# Patient Record
Sex: MALE | Race: WHITE | NOT HISPANIC OR LATINO | Employment: FULL TIME | URBAN - METROPOLITAN AREA
[De-identification: names, ages, dates, MRNs, and addresses within clinical notes are randomized per-mention and may not be internally consistent; named-entity substitution may affect disease eponyms.]

---

## 2017-01-31 ENCOUNTER — PATIENT MESSAGE (OUTPATIENT)
Dept: MEDICAL GROUP | Facility: PHYSICIAN GROUP | Age: 27
End: 2017-01-31

## 2017-01-31 RX ORDER — RIVAROXABAN 20 MG/1
TABLET, FILM COATED ORAL
Qty: 90 TAB | Refills: 0 | Status: SHIPPED | OUTPATIENT
Start: 2017-01-31 | End: 2019-03-21

## 2017-02-02 ENCOUNTER — TELEPHONE (OUTPATIENT)
Dept: MEDICAL GROUP | Facility: PHYSICIAN GROUP | Age: 27
End: 2017-02-02

## 2017-02-02 NOTE — TELEPHONE ENCOUNTER
MEDICATION PRIOR AUTHORIZATION NEEDED:    1. Name of Medication: Xarelto 20 mg    2. Requested By (Name of Pharmacy): [facility]     3. Is insurance on file current? Yes    4. What is the name & phone number of the 3rd party payor? [facility] [phone]

## 2019-03-21 ENCOUNTER — OFFICE VISIT (OUTPATIENT)
Dept: URGENT CARE | Facility: CLINIC | Age: 29
End: 2019-03-21
Payer: COMMERCIAL

## 2019-03-21 VITALS
HEIGHT: 73 IN | HEART RATE: 101 BPM | BODY MASS INDEX: 38.8 KG/M2 | WEIGHT: 292.8 LBS | SYSTOLIC BLOOD PRESSURE: 118 MMHG | OXYGEN SATURATION: 95 % | DIASTOLIC BLOOD PRESSURE: 82 MMHG | TEMPERATURE: 98.2 F | RESPIRATION RATE: 18 BRPM

## 2019-03-21 DIAGNOSIS — J06.9 VIRAL URI WITH COUGH: ICD-10-CM

## 2019-03-21 DIAGNOSIS — J04.0 VIRAL LARYNGITIS: ICD-10-CM

## 2019-03-21 DIAGNOSIS — B97.89 VIRAL LARYNGITIS: ICD-10-CM

## 2019-03-21 PROCEDURE — 99214 OFFICE O/P EST MOD 30 MIN: CPT | Performed by: PHYSICIAN ASSISTANT

## 2019-03-21 RX ORDER — CODEINE PHOSPHATE/GUAIFENESIN 10-100MG/5
5 LIQUID (ML) ORAL 3 TIMES DAILY PRN
Qty: 120 ML | Refills: 0 | Status: SHIPPED | OUTPATIENT
Start: 2019-03-21 | End: 2019-03-28

## 2019-03-21 RX ORDER — METOPROLOL SUCCINATE 25 MG/1
25 TABLET, EXTENDED RELEASE ORAL DAILY
COMMUNITY
End: 2019-04-26 | Stop reason: SDUPTHER

## 2019-03-21 RX ORDER — AMLODIPINE BESYLATE 10 MG/1
10 TABLET ORAL DAILY
COMMUNITY
End: 2019-09-09

## 2019-03-21 RX ORDER — BENZONATATE 100 MG/1
100-200 CAPSULE ORAL 3 TIMES DAILY PRN
Qty: 60 CAP | Refills: 0 | Status: SHIPPED | OUTPATIENT
Start: 2019-03-21 | End: 2019-04-26

## 2019-03-21 RX ORDER — LOSARTAN POTASSIUM 25 MG/1
25 TABLET ORAL DAILY
COMMUNITY
End: 2019-09-09

## 2019-03-21 ASSESSMENT — ENCOUNTER SYMPTOMS
CHILLS: 0
SPUTUM PRODUCTION: 0
FEVER: 0
MUSCULOSKELETAL NEGATIVE: 1
MYALGIAS: 0
ABDOMINAL PAIN: 0
DIZZINESS: 0
DIARRHEA: 0
VOMITING: 0
NAUSEA: 0
SHORTNESS OF BREATH: 0
COUGH: 1
SORE THROAT: 1
WHEEZING: 1

## 2019-03-21 ASSESSMENT — COPD QUESTIONNAIRES: COPD: 0

## 2019-03-21 NOTE — PROGRESS NOTES
"Subjective:      Panda Pacheco is a 28 y.o. male who presents with Cough (productive cough-x4 days, ) and Pharyngitis (sore throat-x1week loss of voice-x2 days)            Cough   This is a new problem. The current episode started in the past 7 days (6 days). The problem has been unchanged. The problem occurs every few minutes. The cough is non-productive. Associated symptoms include nasal congestion, a sore throat and wheezing. Pertinent negatives include no chest pain, chills, ear congestion, ear pain, fever, myalgias, postnasal drip, rash or shortness of breath. Nothing aggravates the symptoms. He has tried OTC cough suppressant for the symptoms. The treatment provided mild relief. His past medical history is significant for asthma. There is no history of COPD or pneumonia.   Pharyngitis    Associated symptoms include coughing. Pertinent negatives include no abdominal pain, congestion, diarrhea, ear pain, shortness of breath or vomiting.     Patient presents to urgent care reporting a 6 day history of dry cough, sore throat, and hoarse voice. No fevers, chills, body aches, chest pain, productive cough, or SOB. No known sick contacts or recent use of antibiotics.     Review of Systems   Constitutional: Negative for chills and fever.   HENT: Positive for sore throat. Negative for congestion, ear pain and postnasal drip.    Respiratory: Positive for cough and wheezing. Negative for sputum production and shortness of breath.    Cardiovascular: Negative for chest pain.   Gastrointestinal: Negative for abdominal pain, diarrhea, nausea and vomiting.   Genitourinary: Negative.    Musculoskeletal: Negative.  Negative for myalgias.   Skin: Negative for rash.   Neurological: Negative for dizziness.        Objective:     /82 (BP Location: Left arm, Patient Position: Sitting, BP Cuff Size: Large adult)   Pulse (!) 101   Temp 36.8 °C (98.2 °F) (Temporal)   Resp 18   Ht 1.854 m (6' 1\")   Wt (!) 132.8 kg (292 " lb 12.8 oz)   SpO2 95%   BMI 38.63 kg/m²      Physical Exam   Constitutional: He is oriented to person, place, and time. He appears well-developed and well-nourished. No distress.   HENT:   Head: Normocephalic and atraumatic.   Right Ear: Hearing, tympanic membrane, external ear and ear canal normal.   Left Ear: Hearing, tympanic membrane, external ear and ear canal normal.   Mouth/Throat: Posterior oropharyngeal erythema present. No oropharyngeal exudate or posterior oropharyngeal edema.   Eyes: Pupils are equal, round, and reactive to light. Conjunctivae are normal. Right eye exhibits no discharge. Left eye exhibits no discharge.   Neck: Normal range of motion.   Cardiovascular: Normal rate, regular rhythm and normal heart sounds.    No murmur heard.  Pulmonary/Chest: Effort normal. No respiratory distress. He has no wheezes.   Musculoskeletal: Normal range of motion.   Neurological: He is alert and oriented to person, place, and time.   Skin: Skin is warm and dry. He is not diaphoretic.   Psychiatric: He has a normal mood and affect. His behavior is normal.   Nursing note and vitals reviewed.       PMH:  has a past medical history of DVT (deep venous thrombosis) ([facility]); Lung collapse (2011); and PE (pulmonary embolism).  MEDS:   Current Outpatient Prescriptions:   •  metoprolol SR (TOPROL XL) 25 MG TABLET SR 24 HR, Take 25 mg by mouth every day., Disp: , Rfl:   •  losartan (COZAAR) 25 MG Tab, Take 25 mg by mouth every day., Disp: , Rfl:   •  amLODIPine (NORVASC) 10 MG Tab, Take 10 mg by mouth every day., Disp: , Rfl:   •  aspirin EC (ECOTRIN) 81 MG Tablet Delayed Response, Take 81 mg by mouth every day., Disp: , Rfl:   •  benzonatate (TESSALON) 100 MG Cap, Take 1-2 Caps by mouth 3 times a day as needed., Disp: 60 Cap, Rfl: 0  •  guaifenesin-codeine (TUSSI-ORGANIDIN NR) 100-10 MG/5ML syrup, Take 5 mL by mouth 3 times a day as needed for Cough for up to 7 days., Disp: 120 mL, Rfl: 0  •  mupirocin (BACTROBAN) 2 %  Ointment, Apply bid to affected skin area(s) (Patient not taking: Reported on 3/21/2019), Disp: 30 g, Rfl: 1  •  acetaminophen (TYLENOL) 500 MG Tab, Take 500-1,000 mg by mouth every 6 hours as needed., Disp: , Rfl:   ALLERGIES:   Allergies   Allergen Reactions   • Gluten Meal Diarrhea     GI discomfort     SURGHX:   Past Surgical History:   Procedure Laterality Date   • TONSILLECTOMY       SOCHX:  reports that he has never smoked. He has never used smokeless tobacco. He reports that he drinks alcohol. He reports that he does not use drugs.  FH: family history is not on file.       Assessment/Plan:     1. Viral laryngitis    2. Viral URI with cough  - benzonatate (TESSALON) 100 MG Cap; Take 1-2 Caps by mouth 3 times a day as needed.  Dispense: 60 Cap; Refill: 0  - guaifenesin-codeine (TUSSI-ORGANIDIN NR) 100-10 MG/5ML syrup; Take 5 mL by mouth 3 times a day as needed for Cough for up to 7 days.  Dispense: 120 mL; Refill: 0   - Will cause sedation, avoid driving, operating heavy machinery, and drinking alcohol    Advised patient symptoms are most likely viral in etiology, recommend supportive care. Increased fluids and rest. Tessalon perles and codeine cough syrup as needed for symptomatic relief. Encouraged voice rest, use of humidifier, and warm fluids for laryngitis. Call or return to office if symptoms persist or worsen. The patient demonstrated a good understanding and agreed with the treatment plan.

## 2019-03-21 NOTE — LETTER
March 21, 2019         Patient: Panda Pacheco   YOB: 1990   Date of Visit: 3/21/2019           To Whom it May Concern:    Panda Pacheco was seen in my clinic on 3/21/2019. Please excuse his absence from 3/20/19-3/22/19.     If you have any questions or concerns, please don't hesitate to call.        Sincerely,           Whitley Berg P.A.-C.  Electronically Signed

## 2019-04-26 ENCOUNTER — OFFICE VISIT (OUTPATIENT)
Dept: MEDICAL GROUP | Age: 29
End: 2019-04-26
Payer: COMMERCIAL

## 2019-04-26 VITALS
BODY MASS INDEX: 39.81 KG/M2 | HEIGHT: 73 IN | HEART RATE: 100 BPM | WEIGHT: 300.4 LBS | DIASTOLIC BLOOD PRESSURE: 74 MMHG | OXYGEN SATURATION: 97 % | SYSTOLIC BLOOD PRESSURE: 122 MMHG | TEMPERATURE: 98.1 F

## 2019-04-26 DIAGNOSIS — I47.10 SVT (SUPRAVENTRICULAR TACHYCARDIA): ICD-10-CM

## 2019-04-26 DIAGNOSIS — Z76.89 ESTABLISHING CARE WITH NEW DOCTOR, ENCOUNTER FOR: ICD-10-CM

## 2019-04-26 DIAGNOSIS — Z86.711 HISTORY OF PULMONARY EMBOLUS (PE): ICD-10-CM

## 2019-04-26 DIAGNOSIS — J45.20 MILD INTERMITTENT ASTHMA WITHOUT COMPLICATION: ICD-10-CM

## 2019-04-26 DIAGNOSIS — I10 ESSENTIAL HYPERTENSION: ICD-10-CM

## 2019-04-26 PROCEDURE — 99214 OFFICE O/P EST MOD 30 MIN: CPT | Performed by: PHYSICIAN ASSISTANT

## 2019-04-26 RX ORDER — ALBUTEROL SULFATE 90 UG/1
2 AEROSOL, METERED RESPIRATORY (INHALATION) EVERY 6 HOURS PRN
COMMUNITY

## 2019-04-26 RX ORDER — MONTELUKAST SODIUM 10 MG/1
10 TABLET ORAL DAILY
Qty: 90 TAB | Refills: 1 | Status: SHIPPED | OUTPATIENT
Start: 2019-04-26 | End: 2019-12-03 | Stop reason: SDUPTHER

## 2019-04-26 RX ORDER — METOPROLOL SUCCINATE 25 MG/1
25 TABLET, EXTENDED RELEASE ORAL DAILY
Qty: 90 TAB | Refills: 1 | Status: SHIPPED | OUTPATIENT
Start: 2019-04-26 | End: 2019-12-03 | Stop reason: SDUPTHER

## 2019-04-26 ASSESSMENT — PATIENT HEALTH QUESTIONNAIRE - PHQ9: CLINICAL INTERPRETATION OF PHQ2 SCORE: 0

## 2019-04-26 NOTE — ASSESSMENT & PLAN NOTE
His hypertension was acquired after  his pulmonary emboli.  He was also advised to not exercise for a year and had a moderate amount of weight gain.  He has been out of his metoprolol and losartan for the past 3 weeks.  And needs a refill of these.  He has not been monitoring his blood pressure at home.  Denies chest pain, shortness of breath, dizziness, headaches,

## 2019-04-26 NOTE — ASSESSMENT & PLAN NOTE
Approximately 3 years ago patient had a fracture to his right leg and subsequently obtained a DVT with 8 pulmonary emboli.  He had a fairly complicated course after this.  He continued to have shortness of breath with work-up by pulmonology and cardiology.  He did not have pulmonary hypertension, but was having SVT.  He has been followed every 6 months by cardiology down in [place], with repeat stress test and echo cardiogram.  Most recent tests were done in November 2018.  They were all normal per patient including CT chest.  He was cleared by pulmonology about a year ago,  with recommendations to continue on aspirin.  Denies claudication symptoms, lower extremity edema, calf swelling/warmth, chest pain.

## 2019-04-26 NOTE — ASSESSMENT & PLAN NOTE
This was acquired after his pulmonary embolism.  He has been well controlled on the metoprolol.  He has not had this medication for about 3 weeks and on his apple watch his heart rate gets up to about 170.  He does intermittently feel palpitations.  Denies dizziness, chest pain, shortness of breath, syncope, visual changes

## 2019-04-26 NOTE — PROGRESS NOTES
This medical record contains text that has been entered with the assistance of computer voice recognition and dictation software.  Therefore, it may contain unintended errors in text, spelling, punctuation, or grammar    Chief Complaint   Patient presents with   • Establish Care   • Medication Refill         Panda Pacheco is a 28 y.o. male here evaluation and management of: Establish care and medication refill    HPI:   [facility]anda [facility].  He recently moved back to the area from [place].  He is  and works as a .    History of pulmonary embolus (PE)  Approximately 3 years ago patient had a fracture to his right leg and subsequently obtained a DVT with 8 pulmonary emboli.  He had a fairly complicated course after this.  He continued to have shortness of breath with work-up by pulmonology and cardiology.  He did not have pulmonary hypertension, but was having SVT.  He has been followed every 6 months by cardiology down in [place], with repeat stress test and echo cardiogram.  Most recent tests were done in November 2018.  They were all normal per patient including CT chest.  He was cleared by pulmonology about a year ago,  with recommendations to continue on aspirin.  Denies claudication symptoms, lower extremity edema, calf swelling/warmth, chest pain.    Essential hypertension  His hypertension was acquired after  his pulmonary emboli.  He was also advised to not exercise for a year and had a moderate amount of weight gain.  He has been out of his metoprolol and losartan for the past 3 weeks.  And needs a refill of these.  He has not been monitoring his blood pressure at home.  Denies chest pain, shortness of breath, dizziness, headaches,    Mild intermittent asthma without complication  He was prescribed albuterol by his pulmonologist, as he was having more exercise-induced and seasonal allergic asthma.  Uses the inhaler maybe once or twice a week.  It is more frequent  during allergy season.  He also had Singulair last year, which was very helpful.  He is wondering if it had a prescription for this.  As he started to have nasal congestion, rhinorrhea, itchy watery eyes.  Denies fever, chills, sweats, sinus pressure.    SVT (supraventricular tachycardia) ([facility])  This was acquired after his pulmonary embolism.  He has been well controlled on the metoprolol.  He has not had this medication for about 3 weeks and on his apple watch his heart rate gets up to about 170.  He does intermittently feel palpitations.  Denies dizziness, chest pain, shortness of breath, syncope, visual changes    Current medicines (including changes today)  Current Outpatient Prescriptions   Medication Sig Dispense Refill   • albuterol 108 (90 Base) MCG/ACT Aero Soln inhalation aerosol Inhale 2 Puffs by mouth every 6 hours as needed for Shortness of Breath.     • metoprolol SR (TOPROL XL) 25 MG TABLET SR 24 HR Take 1 Tab by mouth every day. 90 Tab 1   • montelukast (SINGULAIR) 10 MG Tab Take 1 Tab by mouth every day. 90 Tab 1   • losartan (COZAAR) 25 MG Tab Take 25 mg by mouth every day.     • amLODIPine (NORVASC) 10 MG Tab Take 10 mg by mouth every day.     • aspirin EC (ECOTRIN) 81 MG Tablet Delayed Response Take 81 mg by mouth every day.     • acetaminophen (TYLENOL) 500 MG Tab Take 500-1,000 mg by mouth every 6 hours as needed.       No current facility-administered medications for this visit.      He  has a past medical history of DVT (deep venous thrombosis) ([facility]); Lung collapse (2011); and PE (pulmonary embolism).  He  has a past surgical history that includes tonsillectomy and adenoidectomy.  Social History   Substance Use Topics   • Smoking status: Never Smoker   • Smokeless tobacco: Never Used   • Alcohol use Yes      Comment: rarely     Social History     Social History Narrative   • No narrative on file     Family History   Problem Relation Age of Onset   • Hypertension Mother    • Diabetes Father   "  • No Known Problems Sister    • No Known Problems Brother      Family Status   Relation Status   • Mo Alive   • Fa Alive   • Sis Alive   • Bro Alive         ROS    Please see hpi     All other systems reviewed and are negative     Objective:     /74 (BP Location: Left arm, Patient Position: Sitting, BP Cuff Size: Large adult)   Pulse 100   Temp 36.7 °C (98.1 °F) (Temporal)   Ht 1.854 m (6' 1\")   Wt (!) 136.3 kg (300 lb 6.4 oz)   SpO2 97%  Body mass index is 39.63 kg/m².  Physical Exam:    Constitutional: Alert, no distress.  Skin: Warm, dry, good turgor, no rashes in visible areas  Eye: Equal, round and reactive, conjunctiva clear, lids normal.  ENMT: Lips without lesions, good dentition, oropharynx clear.  Neck: Trachea midline, no masses, no thyromegaly. No cervical or supraclavicular lymphadenopathy.  Respiratory: Unlabored respiratory effort, lungs clear to auscultation, no wheezes, no ronchi.  Cardiovascular: Normal S1, S2, mildly tachycardic, no murmur, no edema  Psych: Alert and oriented x3, normal affect and mood.          Assessment and Plan:   The following treatment plan was discussed      1. SVT (supraventricular tachycardia) (HCC)  -Patient has reported heart rates of 170.  We will refill his metoprolol for him.  Advised if the 25 mg of metoprolol is not keeping his heart rate under 100 to call  the clinic and will increase to 50 mg.  He is to follow-up with cardiology as recommended  - metoprolol SR (TOPROL XL) 25 MG TABLET SR 24 HR; Take 1 Tab by mouth every day.  Dispense: 90 Tab; Refill: 1    2. Essential hypertension  -His blood pressures well controlled in the clinic today.  He has not been taking his losartan for about 3 weeks.  Discussed that we will review his blood pressure at home and if it remains less than 140 systolic the need to starting to be continued on the losartan.  However, if his blood pressure is greater than 140 systolic consistently to call the clinic and we will " send in a prescription for 25 mg of losartan.   - metoprolol SR (TOPROL XL) 25 MG TABLET SR 24 HR; Take 1 Tab by mouth every day.  Dispense: 90 Tab; Refill: 1    3. Mild intermittent asthma without complication  -The Singulair helps him during allergy season with his allergies and asthma.  Prescription sent to the pharmacy.  Continue with albuterol.  - montelukast (SINGULAIR) 10 MG Tab; Take 1 Tab by mouth every day.  Dispense: 90 Tab; Refill: 1    4. History of pulmonary embolus (PE)  -Continue with aspirin.  He has been cleared by pulmonology    5. Establishing care with new doctor, encounter for  -We will request old records and review when received            Instructed to Follow up in clinic or ER for worsening symptoms, difficulty breathing, lack of expected recovery, or should new symptoms or problems arise.    Followup: Return in about 4 months (around 8/26/2019) for Annual PX.       Once again this medical record contains text that has been entered with the assistance of computer voice recognition and dictation software.  Therefore, it may contain unintended errors in text, spelling, punctuation, or grammar

## 2019-04-26 NOTE — ASSESSMENT & PLAN NOTE
He was prescribed albuterol by his pulmonologist, as he was having more exercise-induced and seasonal allergic asthma.  Uses the inhaler maybe once or twice a week.  It is more frequent during allergy season.  He also had Singulair last year, which was very helpful.  He is wondering if it had a prescription for this.  As he started to have nasal congestion, rhinorrhea, itchy watery eyes.  Denies fever, chills, sweats, sinus pressure.

## 2019-08-30 ENCOUNTER — APPOINTMENT (OUTPATIENT)
Dept: MEDICAL GROUP | Age: 29
End: 2019-08-30
Payer: COMMERCIAL

## 2019-09-09 ENCOUNTER — OFFICE VISIT (OUTPATIENT)
Dept: MEDICAL GROUP | Age: 29
End: 2019-09-09
Payer: COMMERCIAL

## 2019-09-09 ENCOUNTER — APPOINTMENT (OUTPATIENT)
Dept: MEDICAL GROUP | Age: 29
End: 2019-09-09
Payer: COMMERCIAL

## 2019-09-09 VITALS
TEMPERATURE: 86.9 F | HEART RATE: 94 BPM | SYSTOLIC BLOOD PRESSURE: 116 MMHG | OXYGEN SATURATION: 96 % | BODY MASS INDEX: 39.76 KG/M2 | HEIGHT: 73 IN | WEIGHT: 300 LBS | DIASTOLIC BLOOD PRESSURE: 64 MMHG

## 2019-09-09 DIAGNOSIS — E66.9 OBESITY (BMI 35.0-39.9 WITHOUT COMORBIDITY): ICD-10-CM

## 2019-09-09 DIAGNOSIS — I47.10 SVT (SUPRAVENTRICULAR TACHYCARDIA): ICD-10-CM

## 2019-09-09 DIAGNOSIS — I10 ESSENTIAL HYPERTENSION: ICD-10-CM

## 2019-09-09 DIAGNOSIS — J45.20 MILD INTERMITTENT ASTHMA WITHOUT COMPLICATION: ICD-10-CM

## 2019-09-09 DIAGNOSIS — Z23 NEED FOR VACCINATION: ICD-10-CM

## 2019-09-09 DIAGNOSIS — Z86.711 HISTORY OF PULMONARY EMBOLUS (PE): ICD-10-CM

## 2019-09-09 DIAGNOSIS — Z00.00 WELL ADULT EXAM: ICD-10-CM

## 2019-09-09 PROCEDURE — 90471 IMMUNIZATION ADMIN: CPT | Performed by: PHYSICIAN ASSISTANT

## 2019-09-09 PROCEDURE — 99395 PREV VISIT EST AGE 18-39: CPT | Mod: 25 | Performed by: PHYSICIAN ASSISTANT

## 2019-09-09 PROCEDURE — 90732 PPSV23 VACC 2 YRS+ SUBQ/IM: CPT | Performed by: PHYSICIAN ASSISTANT

## 2019-09-09 NOTE — LETTER
[facility]  Mady Bourne P.A.-C.  [address] [place] [address]  Fax: [phone]   Authorization for Release/Disclosure of   Protected Health Information   Name: NAOMI MORRIS : 1990 SSN: [id number]   Address: [address] [place], [place] [address] [place] [address] Phone:    [phone] (home)    I authorize the entity listed below to release/disclose the PHI below to:   Critical access hospital/             Mady Bourne P.A.-C.   Provider or Entity Name:        [facility]                                                                 DR. EDEN SCALES         Address   City, [facility], [place], [place] Phone: [phone]      Fax:   [phone]     Reason for request: continuity of care   Information to be released:    [  ] LAST COLONOSCOPY,  including any PATH REPORT and follow-up  [  ] LAST FIT/COLOGUARD RESULT [  ] LAST DEXA  [  ] LAST MAMMOGRAM  [  ] LAST PAP  [  ] LAST LABS [  ] RETINA EXAM REPORT  [  ] IMMUNIZATION RECORDS  [XX] Release all info      [  ] Check here and initial the line next to each item to release ALL health information INCLUDING  _____ Care and treatment for drug and / or alcohol abuse  _____ HIV testing, infection status, or AIDS  _____ Genetic Testing    DATES OF SERVICE OR TIME PERIOD TO BE DISCLOSED: _____________  I understand and acknowledge that:  * This Authorization may be revoked at any time by you in writing, except if your health information has already been used or disclosed.  * Your health information that will be used or disclosed as a result of you signing this authorization could be re-disclosed by the recipient. If this occurs, your re-disclosed health information may no longer be protected by State or Federal laws.  * You may refuse to sign this Authorization. Your refusal will not affect your ability to obtain treatment.  * This Authorization becomes effective upon signing and will  on (date) __________.      If no date is indicated,  this Authorization will  one (1) year from the signature date.    Name: Panda Pacheco    Signature:   Date:     2019       PLEASE FAX REQUESTED RECORDS BACK TO: [phone]

## 2019-09-10 NOTE — PROGRESS NOTES
cc: well exam    Subjective:     Panda Pacheco is a 29 y.o. male presents for a routine preventive health exam.  He exercises anywhere from 2-4 times a week with various weightlifting activities and cardio.  He does have a fairly well-rounded diet-but states it could be better.    He had a pulmonary embolism about 3 years ago-after fracture of his right leg.  He was previously followed by pulmonology and cardiology down in [place].  He did not have pulmonary hypertension, but was having SVT.  He was followed about every 6 months.  He was unable to get established here locally.  And is requesting referral to cardiology.  He very intermittently will have palpitations, otherwise is asymptomatic.         Review of systems:    Constitutional:  No F/C, night sweats, weight loss, fatigue, or trouble sleeping  Head/Neck: No headache, dizziness, neck pain, or dmitry enlargement  Eyes: No change in vision, flashing lights, pain, redness, discharge  Ears: No pain, tinnitus, discharge, hearing loss  Nose: No discharge, sinus pain, nosebleeds, allergies  Mouth/Throat: No sores or lesions, sore throat, hoarseness, dysphagia  Lungs: No cough, sob, dyspnea, or wheezing  Cardiac: No chest pain, syncope, or LE edema.  Positive for palpitations  Skin: No color changes, itching, dryness, rashes  Heme: No increased bruising or prolonged bleeding  Endo: No heat or cold intolerance, excessively dry skin, sweating, polydipsia, polyuria, or polyphagia.    GI: No pain, n/v, heartburn, blood in stool, constipation or diarrhea  MSK: No joint pain, stiffness, swelling, heat, redness        Current Outpatient Medications:   •  albuterol 108 (90 Base) MCG/ACT Aero Soln inhalation aerosol, Inhale 2 Puffs by mouth every 6 hours as needed for Shortness of Breath., Disp: , Rfl:   •  metoprolol SR (TOPROL XL) 25 MG TABLET SR 24 HR, Take 1 Tab by mouth every day., Disp: 90 Tab, Rfl: 1  •  montelukast (SINGULAIR) 10 MG Tab, Take 1 Tab by mouth  every day., Disp: 90 Tab, Rfl: 1  •  aspirin EC (ECOTRIN) 81 MG Tablet Delayed Response, Take 81 mg by mouth every day., Disp: , Rfl:   •  acetaminophen (TYLENOL) 500 MG Tab, Take 500-1,000 mg by mouth every 6 hours as needed., Disp: , Rfl:     Allergies   Allergen Reactions   • Gluten Meal Diarrhea     GI discomfort       Past Medical History:   Diagnosis Date   • DVT (deep venous thrombosis) (HCC)    • Lung collapse 2011    Left   • PE (pulmonary embolism)      Past Surgical History:   Procedure Laterality Date   • ADENOIDECTOMY     • TONSILLECTOMY       Family History   Problem Relation Age of Onset   • Hypertension Mother    • Diabetes Father    • No Known Problems Sister    • No Known Problems Brother      Social History     Socioeconomic History   • Marital status:      Spouse name: Not on file   • Number of children: Not on file   • Years of education: Not on file   • Highest education level: Not on file   Occupational History   • Not on file   Social Needs   • Financial resource strain: Not on file   • Food insecurity:     Worry: Not on file     Inability: Not on file   • Transportation needs:     Medical: Not on file     Non-medical: Not on file   Tobacco Use   • Smoking status: Never Smoker   • Smokeless tobacco: Never Used   Substance and Sexual Activity   • Alcohol use: Yes     Comment: rarely   • Drug use: No   • Sexual activity: Yes     Partners: Female   Lifestyle   • Physical activity:     Days per week: Not on file     Minutes per session: Not on file   • Stress: Not on file   Relationships   • Social connections:     Talks on phone: Not on file     Gets together: Not on file     Attends Taoism service: Not on file     Active member of club or organization: Not on file     Attends meetings of clubs or organizations: Not on file     Relationship status: Not on file   • Intimate partner violence:     Fear of current or ex partner: Not on file     Emotionally abused: Not on file      "Physically abused: Not on file     Forced sexual activity: Not on file   Other Topics Concern   • Not on file   Social History Narrative   • Not on file       Objective:     Vitals: /64 (BP Location: Left arm, Patient Position: Sitting, BP Cuff Size: Adult)   Pulse 94   Temp (!) 30.5 °C (86.9 °F) (Temporal)   Ht 1.854 m (6' 1\")   Wt (!) 136.1 kg (300 lb)   SpO2 96%   BMI 39.58 kg/m²   General: Alert, pleasant, NAD  HEENT:  Normocephalic.  PERRL, EOMI, no icterus or pallor.  Conjunctivae and lids normal.  External ears normal. Tympanic membranes pearly, opaque.  Oropharynx non-erythematous, mucous membranes moist.  Neck supple.  No thyromegaly or masses palpated. No cervical or supraclavicular lymphadenopathy. No Carotid bruits.   Heart:  Regular rate and rhythm.  S1 and S2 normal.  No murmurs appreciated.  Respiratory:  Normal respiratory effort.  Clear to auscultation bilaterally.  Abdomen:  Bowel sounds present.  Non-distended, soft, non-tender, no guarding/rebound. No hepatosplenomegaly.  No hernias.  Skin:  Warm, dry, no rashes  Musculoskeletal:  Gait is normal.  Moves all extremities well.  Extremities:  Pedal pulses 2+ symmetric. No leg edema.  Neurological: No tremors, sensation grossly intact, patellar and biceps reflexes 2+ symmetric, tone/strength normal, CN 2-12 intact.  Psych:  Affect/mood is normal, judgement is good, memory is intact, grooming is appropriate.          Assessment/Plan:     Panda was seen today for annual exam.    Diagnoses and all orders for this visit:    Well adult exam  -We will draw below labs.  Further treatment if needed pending results.  Advised to increase physical activity as tolerated and reviewed better control diet.  -     CBC WITH DIFFERENTIAL; Future  -     Comp Metabolic Panel; Future  -     Lipid Profile; Future  -     TSH WITH REFLEX TO FT4; Future    SVT (supraventricular tachycardia) (HCC)  -Stable with metoprolol.  Continue current medication.  He was " previously followed by cardiology in [place].  Will place referral to cardiology here locally for monitoring  -     REFERRAL TO CARDIOLOGY    History of pulmonary embolus (PE)  -He was cleared by pulmonology.  Continue with aspirin    Mild intermittent asthma without complication  -Stable.  Continue albuterol and Singulair as needed  -     CBC WITH DIFFERENTIAL; Future    Essential hypertension  -Controlled.  Continue metoprolol.    Obesity (BMI 35.0-39.9 without comorbidity)  -Reviewed lifestyle modifications  -     Comp Metabolic Panel; Future  -     Lipid Profile; Future  -     TSH WITH REFLEX TO FT4; Future  -     Patient identified as having weight management issue.  Appropriate orders and counseling given..    Need for vaccination  -Immunization given in clinic today  - Pneumovax Vaccine (PPSV23)         Patient Counseling:  --Discussed moderation in sodium/caffeine intake, saturated fat and cholesterol, caloric balance, sufficient fresh fruits/vegetables, fiber, iron  --Discussed brushing, flossing, and dental visits.   --Encouraged regular exercise.   --Discussed tobacco, alcohol, or other drug use; availability of treatment for abuse.   --Discussed sexually transmitted infections, partner selection, use of condoms, avoidance of unintended pregnancy and contraceptive alternatives.  --Injury prevention: Discussed safety belts, safety helmets, smoke detector, etc.    Preventive visit in 1 year, sooner as needed for any concerns.

## 2020-07-20 DIAGNOSIS — J45.20 MILD INTERMITTENT ASTHMA WITHOUT COMPLICATION: ICD-10-CM

## 2020-07-20 DIAGNOSIS — I47.10 SVT (SUPRAVENTRICULAR TACHYCARDIA) (HCC): ICD-10-CM

## 2020-07-20 DIAGNOSIS — I10 ESSENTIAL HYPERTENSION: ICD-10-CM

## 2020-07-20 RX ORDER — METOPROLOL SUCCINATE 25 MG/1
25 TABLET, EXTENDED RELEASE ORAL
Qty: 90 TAB | Refills: 3 | Status: SHIPPED | OUTPATIENT
Start: 2020-07-20

## 2020-07-20 RX ORDER — MONTELUKAST SODIUM 10 MG/1
10 TABLET ORAL
Qty: 90 TAB | Refills: 1 | Status: SHIPPED | OUTPATIENT
Start: 2020-07-20

## 2020-07-30 ENCOUNTER — HOSPITAL ENCOUNTER (OUTPATIENT)
Dept: LAB | Facility: MEDICAL CENTER | Age: 30
End: 2020-07-30
Attending: PHYSICIAN ASSISTANT
Payer: COMMERCIAL

## 2020-07-30 DIAGNOSIS — E66.9 OBESITY (BMI 35.0-39.9 WITHOUT COMORBIDITY): ICD-10-CM

## 2020-07-30 DIAGNOSIS — Z00.00 WELL ADULT EXAM: ICD-10-CM

## 2020-07-30 DIAGNOSIS — J45.20 MILD INTERMITTENT ASTHMA WITHOUT COMPLICATION: ICD-10-CM

## 2020-07-30 LAB
ALBUMIN SERPL BCP-MCNC: 4.5 G/DL (ref 3.2–4.9)
ALBUMIN/GLOB SERPL: 1.8 G/DL
ALP SERPL-CCNC: 65 U/L (ref 30–99)
ALT SERPL-CCNC: 35 U/L (ref 2–50)
ANION GAP SERPL CALC-SCNC: 14 MMOL/L (ref 7–16)
AST SERPL-CCNC: 20 U/L (ref 12–45)
BASOPHILS # BLD AUTO: 0.5 % (ref 0–1.8)
BASOPHILS # BLD: 0.03 K/UL (ref 0–0.12)
BILIRUB SERPL-MCNC: 0.3 MG/DL (ref 0.1–1.5)
BUN SERPL-MCNC: 22 MG/DL (ref 8–22)
CALCIUM SERPL-MCNC: 9.3 MG/DL (ref 8.5–10.5)
CHLORIDE SERPL-SCNC: 102 MMOL/L (ref 96–112)
CHOLEST SERPL-MCNC: 182 MG/DL (ref 100–199)
CO2 SERPL-SCNC: 23 MMOL/L (ref 20–33)
CREAT SERPL-MCNC: 1.13 MG/DL (ref 0.5–1.4)
EOSINOPHIL # BLD AUTO: 0.18 K/UL (ref 0–0.51)
EOSINOPHIL NFR BLD: 2.7 % (ref 0–6.9)
ERYTHROCYTE [DISTWIDTH] IN BLOOD BY AUTOMATED COUNT: 42 FL (ref 35.9–50)
FASTING STATUS PATIENT QL REPORTED: NORMAL
GLOBULIN SER CALC-MCNC: 2.5 G/DL (ref 1.9–3.5)
GLUCOSE SERPL-MCNC: 108 MG/DL (ref 65–99)
HCT VFR BLD AUTO: 47.8 % (ref 42–52)
HDLC SERPL-MCNC: 32 MG/DL
HGB BLD-MCNC: 15.4 G/DL (ref 14–18)
IMM GRANULOCYTES # BLD AUTO: 0.02 K/UL (ref 0–0.11)
IMM GRANULOCYTES NFR BLD AUTO: 0.3 % (ref 0–0.9)
LDLC SERPL CALC-MCNC: 121 MG/DL
LYMPHOCYTES # BLD AUTO: 2.66 K/UL (ref 1–4.8)
LYMPHOCYTES NFR BLD: 40.5 % (ref 22–41)
MCH RBC QN AUTO: 28.6 PG (ref 27–33)
MCHC RBC AUTO-ENTMCNC: 32.2 G/DL (ref 33.7–35.3)
MCV RBC AUTO: 88.8 FL (ref 81.4–97.8)
MONOCYTES # BLD AUTO: 0.67 K/UL (ref 0–0.85)
MONOCYTES NFR BLD AUTO: 10.2 % (ref 0–13.4)
NEUTROPHILS # BLD AUTO: 3.01 K/UL (ref 1.82–7.42)
NEUTROPHILS NFR BLD: 45.8 % (ref 44–72)
NRBC # BLD AUTO: 0 K/UL
NRBC BLD-RTO: 0 /100 WBC
PLATELET # BLD AUTO: 204 K/UL (ref 164–446)
PMV BLD AUTO: 10.4 FL (ref 9–12.9)
POTASSIUM SERPL-SCNC: 4.1 MMOL/L (ref 3.6–5.5)
PROT SERPL-MCNC: 7 G/DL (ref 6–8.2)
RBC # BLD AUTO: 5.38 M/UL (ref 4.7–6.1)
SODIUM SERPL-SCNC: 139 MMOL/L (ref 135–145)
TRIGL SERPL-MCNC: 143 MG/DL (ref 0–149)
TSH SERPL DL<=0.005 MIU/L-ACNC: 3.19 UIU/ML (ref 0.38–5.33)
WBC # BLD AUTO: 6.6 K/UL (ref 4.8–10.8)

## 2020-07-30 PROCEDURE — 80061 LIPID PANEL: CPT

## 2020-07-30 PROCEDURE — 85025 COMPLETE CBC W/AUTO DIFF WBC: CPT

## 2020-07-30 PROCEDURE — 80053 COMPREHEN METABOLIC PANEL: CPT

## 2020-07-30 PROCEDURE — 84443 ASSAY THYROID STIM HORMONE: CPT

## 2020-07-30 PROCEDURE — 36415 COLL VENOUS BLD VENIPUNCTURE: CPT

## 2022-04-09 ENCOUNTER — HOSPITAL ENCOUNTER (OUTPATIENT)
Dept: RADIOLOGY | Facility: MEDICAL CENTER | Age: 32
End: 2022-04-09
Attending: PHYSICIAN ASSISTANT
Payer: COMMERCIAL

## 2022-04-09 ENCOUNTER — TELEPHONE (OUTPATIENT)
Dept: SCHEDULING | Facility: IMAGING CENTER | Age: 32
End: 2022-04-09
Payer: COMMERCIAL

## 2022-04-09 ENCOUNTER — OFFICE VISIT (OUTPATIENT)
Dept: URGENT CARE | Facility: PHYSICIAN GROUP | Age: 32
End: 2022-04-09
Payer: COMMERCIAL

## 2022-04-09 VITALS
TEMPERATURE: 98.7 F | OXYGEN SATURATION: 95 % | DIASTOLIC BLOOD PRESSURE: 86 MMHG | HEIGHT: 73 IN | HEART RATE: 97 BPM | BODY MASS INDEX: 38.43 KG/M2 | SYSTOLIC BLOOD PRESSURE: 122 MMHG | WEIGHT: 290 LBS | RESPIRATION RATE: 19 BRPM

## 2022-04-09 DIAGNOSIS — N50.82 SCROTAL PAIN: ICD-10-CM

## 2022-04-09 PROCEDURE — 99215 OFFICE O/P EST HI 40 MIN: CPT | Performed by: PHYSICIAN ASSISTANT

## 2022-04-09 RX ORDER — VALACYCLOVIR HYDROCHLORIDE 1 G/1
1000 TABLET, FILM COATED ORAL 2 TIMES DAILY
COMMUNITY

## 2022-04-09 RX ORDER — PREDNISONE 10 MG/1
10 TABLET ORAL DAILY
COMMUNITY

## 2022-04-09 ASSESSMENT — FIBROSIS 4 INDEX: FIB4 SCORE: 0.55

## 2022-04-09 NOTE — LETTER
April 9, 2022    To Whom It May Concern:         This is confirmation that Panda Pacheco attended his scheduled appointment with Irvin Nolasco P.A.-C. on 4/09/22.  I've recommended light duty until cleared by another provider, at least 2-3 weeks.         If you have any questions please do not hesitate to call me at the phone number listed below.    Sincerely,          Irvin Nolasco P.A.-C.  [phone]

## 2022-04-10 ENCOUNTER — TELEPHONE (OUTPATIENT)
Dept: URGENT CARE | Facility: PHYSICIAN GROUP | Age: 32
End: 2022-04-10

## 2022-04-10 ENCOUNTER — HOSPITAL ENCOUNTER (OUTPATIENT)
Dept: RADIOLOGY | Facility: MEDICAL CENTER | Age: 32
End: 2022-04-10
Attending: PHYSICIAN ASSISTANT
Payer: COMMERCIAL

## 2022-04-10 DIAGNOSIS — K40.90 NON-RECURRENT UNILATERAL INGUINAL HERNIA WITHOUT OBSTRUCTION OR GANGRENE: ICD-10-CM

## 2022-04-10 PROCEDURE — 76857 US EXAM PELVIC LIMITED: CPT

## 2022-04-10 PROCEDURE — 76870 US EXAM SCROTUM: CPT

## 2022-04-10 NOTE — TELEPHONE ENCOUNTER
Called and spoke with the patient regarding the ultrasound results.  Ultrasound reveals left-sided fat-containing nonreducible inguinal hernia.  Referral already placed to surgery, expect follow-up with surgery once they contact patient.  ER precautions advised.  Patient remains clinically stable with no new symptoms.    The patient was very appreciative of the call and all questions were answered.  I encouraged them to call back if they have any further concerns.    Irvin Nolasco P.A.-C.      RADIOLOGY RESULTS   [id number]    Result Date: 4/10/2022  4/10/2022 8:15 AM HISTORY/REASON FOR EXAM: Pain. Testicular/scrotal pain TECHNIQUE/EXAM DESCRIPTION: Real-time sonography of the scrotum was performed with gray-scale, color and duplex Doppler imaging. COMPARISON: None FINDINGS: The right testis measures 4.21 cm x 2.25 cm x 2.58 cm. Normal in size and echotexture. Normal vascularity on color Doppler. No intratesticular mass. The left testis measures 4.70 cm x 2.34 cm x 2.75 cm. Normal in size and echotexture. Normal vascularity on color Doppler. No intratesticular mass. Vascular flow is symmetric. Appearance of the epididymides are within normal limits. No abnormal volume hydrocele is detected. No varicocele is detected.     1.  No testicular mass or torsion.    US-INGUINAL HERNIA    Result Date: 4/10/2022  4/10/2022 8:15 AM HISTORY/REASON FOR EXAM:  Pain Pain TECHNIQUE/EXAM DESCRIPTION AND NUMBER OF VIEWS:  Left inguinal ultrasound with and without Valsalva maneuver. COMPARISON: None FINDINGS: There is a probable small nonreducible fat-containing left inguinal hernia. There is no mass or fluid collection.     Possible small nonreducible fat-containing left inguinal hernia.

## 2022-04-11 ASSESSMENT — ENCOUNTER SYMPTOMS
MYALGIAS: 0
ABDOMINAL PAIN: 1
CONSTIPATION: 0
VOMITING: 0
FEVER: 0
CHILLS: 0
EYE PAIN: 0
SHORTNESS OF BREATH: 0
DIARRHEA: 0
HEADACHES: 0
NAUSEA: 0
SORE THROAT: 0
COUGH: 0

## 2022-04-11 NOTE — PROGRESS NOTES
"Subjective:   Panda Pacheco is a 31 y.o. male who presents for Abdominal Pain (Lower pain while lifting something.) and Testicle Pain (Left testicle pain)      This pleasant 31-year-old male who presents with 2 to 3 days of lower abdominal pain, pain seems to be slowly insidious onset.  No acute trauma or injury or lifting.  Patient noted some lower abdominal pain mostly on the left side and the pain became more focal resulting in left-sided scrotal pain.  He is noticed some mild left-sided scrotal swelling.  No dysuria, frequency, urgency, nocturia reported.  No concerns of STDs monogamous with longtime female partner.  No history of hernia or other injury.  No recent scrotal injury or frequent jostling.      Review of Systems   Constitutional: Negative for chills and fever.   HENT: Negative for congestion, ear pain and sore throat.    Eyes: Negative for pain.   Respiratory: Negative for cough and shortness of breath.    Cardiovascular: Negative for chest pain.   Gastrointestinal: Positive for abdominal pain. Negative for constipation, diarrhea, nausea and vomiting.   Genitourinary: Negative for dysuria.   Musculoskeletal: Negative for myalgias.   Skin: Negative for rash.   Neurological: Negative for headaches.       Medications, Allergies, and current problem list reviewed today in Epic.     Objective:     /86 (BP Location: Left leg, Patient Position: Sitting, BP Cuff Size: Adult)   Pulse 97   Temp 37.1 °C (98.7 °F) (Tympanic)   Resp 19   Ht 1.854 m (6' 1\")   Wt (!) 132 kg (290 lb)   SpO2 95%     Physical Exam  Vitals reviewed.   Constitutional:       Appearance: Normal appearance.   HENT:      Head: Normocephalic and atraumatic.      Right Ear: External ear normal.      Left Ear: External ear normal.      Nose: Nose normal.      Mouth/Throat:      Mouth: Mucous membranes are moist.   Eyes:      Conjunctiva/sclera: Conjunctivae normal.   Cardiovascular:      Rate and Rhythm: Normal rate. "   Pulmonary:      Effort: Pulmonary effort is normal.   Abdominal:      Tenderness: There is no abdominal tenderness. There is no guarding or rebound.   Genitourinary:     Comments: Normal gross visual inspection of uncircumcised male.  No lymphadenopathy.  No rash or lesions.  Left-sided scrotal tenderness with posterior scrotal fullness, pulsatility noted in the left inguinal canal.  Reducible left-sided hernia, noted bulging with Valsalva.  Possible trace epididymitis versus varicocele.  No Bell Clapper deformity.  Cremasteric reflex intact.  Skin:     General: Skin is warm and dry.      Capillary Refill: Capillary refill takes less than 2 seconds.   Neurological:      Mental Status: He is alert and oriented to person, place, and time.         RADIOLOGY RESULTS   [id number]    Result Date: 4/10/2022  4/10/2022 8:15 AM HISTORY/REASON FOR EXAM: Pain. Testicular/scrotal pain TECHNIQUE/EXAM DESCRIPTION: Real-time sonography of the scrotum was performed with gray-scale, color and duplex Doppler imaging. COMPARISON: None FINDINGS: The right testis measures 4.21 cm x 2.25 cm x 2.58 cm. Normal in size and echotexture. Normal vascularity on color Doppler. No intratesticular mass. The left testis measures 4.70 cm x 2.34 cm x 2.75 cm. Normal in size and echotexture. Normal vascularity on color Doppler. No intratesticular mass. Vascular flow is symmetric. Appearance of the epididymides are within normal limits. No abnormal volume hydrocele is detected. No varicocele is detected.     1.  No testicular mass or torsion.    US-INGUINAL HERNIA    Result Date: 4/10/2022  4/10/2022 8:15 AM HISTORY/REASON FOR EXAM:  Pain Pain TECHNIQUE/EXAM DESCRIPTION AND NUMBER OF VIEWS:  Left inguinal ultrasound with and without Valsalva maneuver. COMPARISON: None FINDINGS: There is a probable small nonreducible fat-containing left inguinal hernia. There is no mass or fluid collection.     Possible small nonreducible fat-containing left  inguinal hernia.            Assessment/Plan:     Diagnosis and associated orders:     1. Scrotal pain  US-INGUINAL HERNIA    [id number]    Referral to General Surgery    CANCELED: [id number]    CANCELED: US-INGUINAL HERNIA      Comments/MDM:     • Schedule patient for an ultrasound the following morning.  I strongly suspect an inguinal hernia causing some referred pain to the scrotum.  Could be some concomitant epididymitis or varicocele causing some discomfort as well.  Offered STD testing but given his sexual health history this is likely more of a structural issue rather than infection.  Referred the patient to general surgery as my strong suspicion is a inguinal hernia.  We will call the patient tomorrow once the results are available.  Discussed ER precautions.  No sign of strangulated incarcerated inguinal hernia.  If clinical change deteriorates I do recommend ER evaluation as this could become a surgical emergency.  Light duty note given.         Differential diagnosis, natural history, supportive care, and indications for immediate follow-up discussed.    Advised the patient to follow-up with the primary care physician for recheck, reevaluation, and consideration of further management.    Please note that this dictation was created using voice recognition software. I have made a reasonable attempt to correct obvious errors, but I expect that there are errors of grammar and possibly content that I did not discover before finalizing the note.    This note was electronically signed by Irvin Nolasco PA-C

## 2024-09-17 ENCOUNTER — OFFICE VISIT (OUTPATIENT)
Dept: URGENT CARE | Facility: PHYSICIAN GROUP | Age: 34
End: 2024-09-17
Payer: COMMERCIAL

## 2024-09-17 VITALS
TEMPERATURE: 98.2 F | HEART RATE: 93 BPM | WEIGHT: 293 LBS | DIASTOLIC BLOOD PRESSURE: 82 MMHG | HEIGHT: 73 IN | SYSTOLIC BLOOD PRESSURE: 126 MMHG | RESPIRATION RATE: 14 BRPM | OXYGEN SATURATION: 96 % | BODY MASS INDEX: 38.83 KG/M2

## 2024-09-17 DIAGNOSIS — J06.9 VIRAL URI WITH COUGH: ICD-10-CM

## 2024-09-17 DIAGNOSIS — R05.1 ACUTE COUGH: ICD-10-CM

## 2024-09-17 PROCEDURE — 3079F DIAST BP 80-89 MM HG: CPT | Performed by: NURSE PRACTITIONER

## 2024-09-17 PROCEDURE — 99213 OFFICE O/P EST LOW 20 MIN: CPT | Performed by: NURSE PRACTITIONER

## 2024-09-17 PROCEDURE — 3074F SYST BP LT 130 MM HG: CPT | Performed by: NURSE PRACTITIONER

## 2024-09-17 ASSESSMENT — ENCOUNTER SYMPTOMS
GASTROINTESTINAL NEGATIVE: 1
NEUROLOGICAL NEGATIVE: 1
WHEEZING: 0
SINUS PAIN: 0
SHORTNESS OF BREATH: 0
CARDIOVASCULAR NEGATIVE: 1
MUSCULOSKELETAL NEGATIVE: 1
COUGH: 1
SORE THROAT: 0
FEVER: 0
CHILLS: 0

## 2024-09-17 NOTE — PROGRESS NOTES
Subjective     Panda Pacheco is a 34 y.o. male who presents with Cough (Productive cough,x8 days )            Cough  Pertinent negatives include no chills, ear pain, fever, sore throat, shortness of breath or wheezing. There is no history of environmental allergies.   Panda has come into urgent care today as he has been experiencing a productive cough with yellow phlegm.   has had upper respiratory symptoms x 8 days.  Initially, did have a low-grade fever but this has improved.  States with having the symptoms while he was on vacation in [place].  Started Mucinex today.  Denies fever, nasal congestion or runny nose, postnasal drainage, sore throat, shortness of breath or wheeze.  States had concern for yellow phlegm with cough.   did take an at home COVID/flu test today which she states was negative.    PMH:  has a past medical history of DVT (deep venous thrombosis) (HCC), Lung collapse (2011), and PE (pulmonary embolism).  MEDS:   Current Outpatient Medications:     metoprolol SR (TOPROL XL) 25 MG TABLET SR 24 HR, Take 1 Tab by mouth every day., Disp: 90 Tab, Rfl: 3    montelukast (SINGULAIR) 10 MG Tab, Take 1 Tab by mouth every day., Disp: 90 Tab, Rfl: 1    acetaminophen (TYLENOL) 500 MG Tab, Take 500-1,000 mg by mouth every 6 hours as needed., Disp: , Rfl:     valacyclovir (VALTREX) 1 GM Tab, Take 1,000 mg by mouth 2 times a day., Disp: , Rfl:     predniSONE (DELTASONE) 10 MG Tab, Take 10 mg by mouth every day., Disp: , Rfl:     albuterol 108 (90 Base) MCG/ACT Aero Soln inhalation aerosol, Inhale 2 Puffs by mouth every 6 hours as needed for Shortness of Breath., Disp: , Rfl:     aspirin EC (ECOTRIN) 81 MG Tablet Delayed Response, Take 81 mg by mouth every day., Disp: , Rfl:   ALLERGIES:   Allergies   Allergen Reactions    Gluten Meal Diarrhea     GI discomfort     SURGHX:   Past Surgical History:   Procedure Laterality Date    ADENOIDECTOMY      TONSILLECTOMY       SOCHX:  reports that he has  "never smoked. He has never used smokeless tobacco. He reports current alcohol use. He reports that he does not use drugs.  FH: Family history was reviewed, no pertinent findings to report      Review of Systems   Constitutional:  Negative for chills, fever and malaise/fatigue.   HENT:  Negative for congestion, ear pain, sinus pain and sore throat.    Respiratory:  Positive for cough. Negative for shortness of breath and wheezing.    Cardiovascular: Negative.    Gastrointestinal: Negative.    Musculoskeletal: Negative.    Neurological: Negative.    Endo/Heme/Allergies:  Negative for environmental allergies.   All other systems reviewed and are negative.             Objective     /82   Pulse 93   Temp 36.8 °C (98.2 °F)   Resp 14   Ht 1.859 m (6' 1.2\")   Wt (!) 133 kg (293 lb)   SpO2 96%   BMI 38.45 kg/m²      Physical Exam  Vitals reviewed.   Constitutional:       General: He is awake. He is not in acute distress.     Appearance: Normal appearance. He is not ill-appearing, toxic-appearing or diaphoretic.   HENT:      Head: Normocephalic.      Right Ear: Tympanic membrane, ear canal and external ear normal.      Left Ear: Ear canal and external ear normal. A middle ear effusion is present.      Nose: Nose normal.      Mouth/Throat:      Lips: Pink.      Mouth: Mucous membranes are dry.      Pharynx: Oropharynx is clear. Uvula midline.   Cardiovascular:      Rate and Rhythm: Normal rate.   Pulmonary:      Effort: Pulmonary effort is normal.      Breath sounds: Normal breath sounds and air entry. No stridor, decreased air movement or transmitted upper airway sounds. No decreased breath sounds, wheezing, rhonchi or rales.   Skin:     General: Skin is warm and dry.   Neurological:      Mental Status: He is alert and oriented to person, place, and time.   Psychiatric:         Attention and Perception: Attention normal.         Mood and Affect: Mood normal.         Speech: Speech normal.         Behavior: " Behavior normal. Behavior is cooperative.                             Assessment & Plan        Assessment & Plan  Acute cough         Viral URI with cough       -Declines cough suppressant at this time   -Maintain hydration/water intake  -May use over the counter Ibuprofen/Tylenol as needed for any fever or chest discomfort with coughing  -May use humidifier/vaporizer at home as needed for dry cough/nasal passages  -Gargle salt water or throat lozenges as needed for throat irritation/dry cough  -Get rest  -May use daily longer acting antihistamine as needed (no decongestant) for any post nasal drainage   -May use saline nasal spray/Flonase as needed to flush any nasal congestion/post nasal drainage   -Monitor for fevers, worse cough, phlegm, shortness of breath, wheeze, chest tightness- need re-evaluation